# Patient Record
Sex: MALE | Race: OTHER | Employment: FULL TIME | URBAN - METROPOLITAN AREA
[De-identification: names, ages, dates, MRNs, and addresses within clinical notes are randomized per-mention and may not be internally consistent; named-entity substitution may affect disease eponyms.]

---

## 2019-09-09 ENCOUNTER — APPOINTMENT (OUTPATIENT)
Dept: GENERAL RADIOLOGY | Age: 39
End: 2019-09-09
Attending: EMERGENCY MEDICINE
Payer: SELF-PAY

## 2019-09-09 ENCOUNTER — HOSPITAL ENCOUNTER (EMERGENCY)
Age: 39
Discharge: HOME | End: 2019-09-09
Attending: EMERGENCY MEDICINE
Payer: SELF-PAY

## 2019-09-09 VITALS
OXYGEN SATURATION: 98 % | HEIGHT: 67 IN | WEIGHT: 165.1 LBS | RESPIRATION RATE: 16 BRPM | SYSTOLIC BLOOD PRESSURE: 104 MMHG | TEMPERATURE: 98.2 F | BODY MASS INDEX: 25.91 KG/M2 | HEART RATE: 78 BPM | DIASTOLIC BLOOD PRESSURE: 63 MMHG

## 2019-09-09 DIAGNOSIS — R10.12 ABDOMINAL PAIN, LUQ (LEFT UPPER QUADRANT): Primary | ICD-10-CM

## 2019-09-09 LAB
ALBUMIN SERPL-MCNC: 3.9 G/DL (ref 3.5–5)
ALBUMIN/GLOB SERPL: 0.9 {RATIO} (ref 1.2–3.5)
ALP SERPL-CCNC: 54 U/L (ref 50–136)
ALT SERPL-CCNC: 37 U/L (ref 12–65)
ANION GAP SERPL CALC-SCNC: 7 MMOL/L (ref 7–16)
AST SERPL-CCNC: 29 U/L (ref 15–37)
BASOPHILS # BLD: 0 K/UL (ref 0–0.2)
BASOPHILS NFR BLD: 0 % (ref 0–2)
BILIRUB SERPL-MCNC: 0.6 MG/DL (ref 0.2–1.1)
BUN SERPL-MCNC: 15 MG/DL (ref 6–23)
CALCIUM SERPL-MCNC: 8.7 MG/DL (ref 8.3–10.4)
CHLORIDE SERPL-SCNC: 105 MMOL/L (ref 98–107)
CO2 SERPL-SCNC: 27 MMOL/L (ref 21–32)
CREAT SERPL-MCNC: 0.72 MG/DL (ref 0.8–1.5)
DIFFERENTIAL METHOD BLD: ABNORMAL
EOSINOPHIL # BLD: 0.3 K/UL (ref 0–0.8)
EOSINOPHIL NFR BLD: 3 % (ref 0.5–7.8)
ERYTHROCYTE [DISTWIDTH] IN BLOOD BY AUTOMATED COUNT: 13.2 % (ref 11.9–14.6)
ETHANOL SERPL-MCNC: <3 MG/DL
GLOBULIN SER CALC-MCNC: 4.3 G/DL (ref 2.3–3.5)
GLUCOSE SERPL-MCNC: 121 MG/DL (ref 65–100)
HCT VFR BLD AUTO: 43.4 % (ref 41.1–50.3)
HGB BLD-MCNC: 13.3 G/DL (ref 13.6–17.2)
IMM GRANULOCYTES # BLD AUTO: 0 K/UL (ref 0–0.5)
IMM GRANULOCYTES NFR BLD AUTO: 0 % (ref 0–5)
LIPASE SERPL-CCNC: 157 U/L (ref 73–393)
LYMPHOCYTES # BLD: 1.5 K/UL (ref 0.5–4.6)
LYMPHOCYTES NFR BLD: 15 % (ref 13–44)
MAGNESIUM SERPL-MCNC: 2 MG/DL (ref 1.8–2.4)
MCH RBC QN AUTO: 26.1 PG (ref 26.1–32.9)
MCHC RBC AUTO-ENTMCNC: 30.6 G/DL (ref 31.4–35)
MCV RBC AUTO: 85.3 FL (ref 79.6–97.8)
MONOCYTES # BLD: 0.7 K/UL (ref 0.1–1.3)
MONOCYTES NFR BLD: 8 % (ref 4–12)
NEUTS SEG # BLD: 7.2 K/UL (ref 1.7–8.2)
NEUTS SEG NFR BLD: 74 % (ref 43–78)
NRBC # BLD: 0 K/UL (ref 0–0.2)
PLATELET # BLD AUTO: 192 K/UL (ref 150–450)
PMV BLD AUTO: 10.8 FL (ref 9.4–12.3)
POTASSIUM SERPL-SCNC: 4.1 MMOL/L (ref 3.5–5.1)
PROT SERPL-MCNC: 8.2 G/DL (ref 6.3–8.2)
RBC # BLD AUTO: 5.09 M/UL (ref 4.23–5.6)
SODIUM SERPL-SCNC: 139 MMOL/L (ref 136–145)
WBC # BLD AUTO: 9.7 K/UL (ref 4.3–11.1)

## 2019-09-09 PROCEDURE — 83690 ASSAY OF LIPASE: CPT

## 2019-09-09 PROCEDURE — 83735 ASSAY OF MAGNESIUM: CPT

## 2019-09-09 PROCEDURE — 74011000250 HC RX REV CODE- 250: Performed by: EMERGENCY MEDICINE

## 2019-09-09 PROCEDURE — 74011250637 HC RX REV CODE- 250/637: Performed by: EMERGENCY MEDICINE

## 2019-09-09 PROCEDURE — 80307 DRUG TEST PRSMV CHEM ANLYZR: CPT

## 2019-09-09 PROCEDURE — 81003 URINALYSIS AUTO W/O SCOPE: CPT | Performed by: EMERGENCY MEDICINE

## 2019-09-09 PROCEDURE — 74019 RADEX ABDOMEN 2 VIEWS: CPT

## 2019-09-09 PROCEDURE — 85025 COMPLETE CBC W/AUTO DIFF WBC: CPT

## 2019-09-09 PROCEDURE — 99283 EMERGENCY DEPT VISIT LOW MDM: CPT | Performed by: EMERGENCY MEDICINE

## 2019-09-09 PROCEDURE — 80053 COMPREHEN METABOLIC PANEL: CPT

## 2019-09-09 RX ORDER — FAMOTIDINE 20 MG/1
20 TABLET, FILM COATED ORAL 2 TIMES DAILY
Qty: 30 TAB | Refills: 0 | Status: SHIPPED | OUTPATIENT
Start: 2019-09-09 | End: 2019-09-24

## 2019-09-09 RX ORDER — MAG HYDROX/ALUMINUM HYD/SIMETH 200-200-20
30 SUSPENSION, ORAL (FINAL DOSE FORM) ORAL
Status: COMPLETED | OUTPATIENT
Start: 2019-09-09 | End: 2019-09-09

## 2019-09-09 RX ORDER — FAMOTIDINE 20 MG/1
20 TABLET, FILM COATED ORAL 2 TIMES DAILY
Qty: 30 TAB | Refills: 0 | Status: SHIPPED | OUTPATIENT
Start: 2019-09-09 | End: 2019-09-09

## 2019-09-09 RX ORDER — LIDOCAINE HYDROCHLORIDE 20 MG/ML
15 SOLUTION OROPHARYNGEAL
Status: COMPLETED | OUTPATIENT
Start: 2019-09-09 | End: 2019-09-09

## 2019-09-09 RX ADMIN — LIDOCAINE HYDROCHLORIDE 15 ML: 20 SOLUTION ORAL; TOPICAL at 12:01

## 2019-09-09 RX ADMIN — ALUMINUM HYDROXIDE, MAGNESIUM HYDROXIDE, AND SIMETHICONE 30 ML: 200; 200; 20 SUSPENSION ORAL at 12:01

## 2019-09-09 NOTE — ED NOTES
Patient complaining of left upper abdominal pain x 1 month. Patient advises he attempted some medication he brought at a home remedy store however it made pain worse. Patient advises also left midaxillary pain. Patient denies any nausea, vomiting, diarrhea or constipation. Advises drinking about 12 beers, 3 times a week.

## 2019-09-09 NOTE — ED NOTES
I have reviewed discharge instructions with the patient. The patient verbalized understanding. Patient left ED via Discharge Method: ambulatory to Home with self.  present at d/c. Opportunity for questions and clarification provided. Patient given 1 scripts. To continue your aftercare when you leave the hospital, you may receive an automated call from our care team to check in on how you are doing. This is a free service and part of our promise to provide the best care and service to meet your aftercare needs.  If you have questions, or wish to unsubscribe from this service please call [phone]. Thank you for Choosing our [employer] [facility].

## 2019-09-09 NOTE — ROUTINE PROCESS
present for assessment with Dr. Donna Valdes, RN and registration.      Georgette Montes CHI//   Patient Relations & Interpreting Services  p: [phone] / [email or web address]

## 2019-09-09 NOTE — DISCHARGE INSTRUCTIONS
Patient Education        Dolor abdominal: Instrucciones de cuidado - [ Abdominal Pain: Care Instructions ]  Instrucciones de cuidado    El dolor abdominal tiene muchas causas posibles. Algunas de ellas no son graves y mejoran por sí solas en unos días. Otras requieren [place] y [place]. Si espinoza dolor continúa o [place], necesitará aren nueva revisión y [place] pruebas para determinar qué pasa. Es posible que necesite cirugía para corregir el problema. No ignore nuevos síntomas, ilan fiebre, náuseas y [place], [address] [facility], dolor que [place] o B[place]. Podrían ser señales de un problema más grave. Espinoza médico puede haberle recomendado aren consulta de [employer] las 8 o 12 horas siguientes. Si no se siente mejor, es posible que requiera [place] o [place]. El médico lo barajas revisado minuciosamente, margie puede nilda problemas más tarde. Si nota algún problema o síntomas nuevos, busque tratamiento médico inmediatamente. La atención de seguimiento es aren parte clave de espinoza tratamiento y seguridad. Asegúrese de hacer y acudir a todas las citas, y llame a espinoza médico si está teniendo problemas. También es aren buena idea saber los resultados de renetta exámenes y mantener aren lista de los medicamentos que tasneem. ¿Cómo puede cuidarse en el hogar? · Descanse hasta que se sienta mejor. · Para prevenir la deshidratación, destiny abundantes líquidos, suficientes para que espinoza orina sea de color amarillo magdy o transparente ilan el agua. Elija beber agua y otros líquidos jocelyn sin cafeína hasta que se sienta mejor. Si tiene [employer] [employer], del corazón o del hígado y tiene que [place] líquidos, hable con espinoza médico antes de aumentar espinoza consumo. · Si tiene [employer], coma alimentos suaves, ilan arroz, pan josue seco o galletas saladas, bananas (plátanos) y puré de [place]. Trate de comer varias comidas pequeñas al día en lugar de dos o britany grandes.   · Espere hasta 48 horas después de que todos los síntomas hayan desaparecido antes de comer alimentos condimentados, alcohol y bebidas que contengan cafeína. · No consuma alimentos ricos en grasa. · Evite medicamentos antiinflamatorios ilan aspirina, ibuprofeno (Advil, Motrin) y naproxeno (Aleve). Pueden causar [employer]. Dígale a espinoza médico si está tomando aspirina diariamente debido a otro problema de cj. ¿Cuándo debe pedir ayuda? Llame al 911 en cualquier momento que considere que necesita atención de emergencia. Por ejemplo, llame si:    · Se desmayó (perdió el conocimiento).   · Las heces son de color rojizo o muy sanguinolentas (con jenny).   · Vomita jenny o algo parecido a granos de café molido.     · Tiene dolor abdominal nuevo e intenso.    Llame a espinoza médico ahora mismo o busque atención médica inmediata si:    · Espinoza dolor empeora, sobre todo si se concentra en aren naresh parte del vientre.     · Vuelve a tener fiebre o tiene fiebre más rocco.     · Danielle heces son negruzcas y parecidas al alquitrán o tienen rastros de jenny.     · Tiene sangrado vaginal inesperado.     · Tiene síntomas de aren infección del tracto urinario. Estos podrían incluir:  ? Dolor al [facility]. ? Orinar con más frecuencia que lo habitual.  ? Jenny en la [facility].     · Siente mareos o aturdimiento, o que está a punto de desmayarse.    Preste especial atención a los cambios en espinoza cj y asegúrese de comunicarse con espinoza médico si:    · No está mejorando después de 1 día (24 horas). ¿Dónde puede encontrar más información en inglés? Natalie Lowry a http://brown-ange.info/. Donia Bucker [id number] en la búsqueda para aprender más acerca de \"Dolor abdominal: Instrucciones de cuidado - [ Abdominal Pain: Care Instructions ]. \"  Revisado: 23 septiembre, 2018  Versión del contenido: 12.1  © 8546-2971 Healthwise, [employer].  Las instrucciones de cuidado fueron adaptadas bajo licencia por [employer] (which disclaims liability or warranty for this information). Si usted tiene [place] afección médica o sobre estas instrucciones, siempre pregunte a espinoza profesional de cj. [facility] niega toda garantía o responsabilidad por espinoza uso de esta información.

## 2019-09-09 NOTE — ED PROVIDER NOTES
Orelia Boast is a 42 y.o. male who presents to the ED with a chief complaint of upper abdominal pain. Pain is 10 out of 10 pain is on the left side has had a history of this pain off and on for a long time it has been worse over the last month. He states he tries various home remedies but they do not typically work he does typically drink 12 beers a day for at least 3 or 4 days of the weekend and states he takes some days off. He denies knowing of any history of kidney stones. He has had no vomiting or diarrhea. He has had some dysuria, but no blood in the urine.  used for interview. Past Medical History:   Diagnosis Date    Ill-defined condition     hernia    Ill-defined condition     kidney       No past surgical history on file. No family history on file. Social History     Socioeconomic History    Marital status: SINGLE     Spouse name: Not on file    Number of children: Not on file    Years of education: Not on file    Highest education level: Not on file   Occupational History    Not on file   Social Needs    Financial resource strain: Not on file    Food insecurity:     Worry: Not on file     Inability: Not on file    Transportation needs:     Medical: Not on file     Non-medical: Not on file   Tobacco Use    Smoking status: Never Smoker    Smokeless tobacco: Never Used   Substance and Sexual Activity    Alcohol use:  Yes     Alcohol/week: 36.0 standard drinks     Types: 36 Cans of beer per week     Comment: advises 12 beers 3 times a week    Drug use: Never    Sexual activity: Not on file   Lifestyle    Physical activity:     Days per week: Not on file     Minutes per session: Not on file    Stress: Not on file   Relationships    Social connections:     Talks on phone: Not on file     Gets together: Not on file     Attends Latter-day service: Not on file     Active member of club or organization: Not on file     Attends meetings of clubs or organizations: Not on file     Relationship status: Not on file    Intimate partner violence:     Fear of current or ex partner: Not on file     Emotionally abused: Not on file     Physically abused: Not on file     Forced sexual activity: Not on file   Other Topics Concern    Not on file   Social History Narrative    Not on file         ALLERGIES: Patient has no known allergies. Review of Systems   Constitutional: Negative for chills and fever. HENT: Negative for congestion, sore throat and trouble swallowing. Respiratory: Negative for cough, choking, chest tightness, shortness of breath, wheezing and stridor. Cardiovascular: Negative for chest pain, palpitations and leg swelling. Gastrointestinal: Positive for abdominal pain and nausea. Negative for abdominal distention, anal bleeding, blood in stool, diarrhea, rectal pain and vomiting. Genitourinary: Positive for dysuria and flank pain. Musculoskeletal: Negative for arthralgias, myalgias, neck pain and neck stiffness. Skin: Negative for color change, pallor, rash and wound. All other systems reviewed and are negative. Vitals:    09/09/19 0955   BP: 104/63   Pulse: 78   Resp: 16   Temp: 98.2 °F (36.8 °C)   SpO2: 98%   Weight: 74.9 kg (165 lb 1.6 oz)   Height: 5' 7\" (1.702 m)            Physical Exam   Constitutional: He is oriented to person, place, and time. He appears well-developed and well-nourished. No distress. HENT:   Head: Normocephalic and atraumatic. Right Ear: External ear normal.   Left Ear: External ear normal.   Mouth/Throat: No oropharyngeal exudate. Eyes: Pupils are equal, round, and reactive to light. Conjunctivae and EOM are normal. Right eye exhibits no discharge. Left eye exhibits no discharge. No scleral icterus. Neck: Normal range of motion. Neck supple. No JVD present. No tracheal deviation present. No thyromegaly present. Cardiovascular: Normal rate, regular rhythm and normal heart sounds.  Exam reveals no gallop and no friction rub. No murmur heard. Pulmonary/Chest: Effort normal and breath sounds normal. No stridor. No respiratory distress. He has no wheezes. He has no rales. He exhibits no tenderness. Abdominal: Soft. He exhibits no distension and no mass. There is no tenderness. There is no rebound and no guarding. Musculoskeletal: Normal range of motion. He exhibits no tenderness. Neurological: He is alert and oriented to person, place, and time. He displays normal reflexes. No cranial nerve deficit. Coordination normal.   Skin: Skin is warm and dry. Capillary refill takes less than 2 seconds. No rash noted. He is not diaphoretic. No erythema. No pallor. Psychiatric: He has a normal mood and affect. His behavior is normal.   Nursing note and vitals reviewed. MDM  Number of Diagnoses or Management Options  Abdominal pain, LUQ (left upper quadrant):   Diagnosis management comments: Patient counseled to avoid alcohol. His labs and x-ray are negative. I did see a negative CT scan done this year for the same pain. I am going to start him on H2 blockers and I will refer to gastroenterology. Nora Wisdom MD; 9/9/2019 @1:08 PM Voice dictation software was used during the making of this note. This software is not perfect and grammatical and other typographical errors may be present.   This note has not been proofread for errors.  ===================================================================          Amount and/or Complexity of Data Reviewed  Clinical lab tests: ordered and reviewed (Results for orders placed or performed during the hospital encounter of 09/09/19  -CBC WITH AUTOMATED DIFF       Result                      Value             Ref Range           WBC                         9.7               4.3 - 11.1 K*       RBC                         5.09              4.23 - 5.6 M*       HGB                         13.3 (L)          13.6 - 17.2 *       HCT                         43.4 41.1 - 50.3 %       MCV                         85.3              79.6 - 97.8 *       MCH                         26.1              26.1 - 32.9 *       MCHC                        30.6 (L)          31.4 - 35.0 *       RDW                         13.2              11.9 - 14.6 %       PLATELET                    192               150 - 450 K/*       MPV                         10.8              9.4 - 12.3 FL       ABSOLUTE NRBC               0.00              0.0 - 0.2 K/*       DF                          AUTOMATED                             NEUTROPHILS                 74                43 - 78 %           LYMPHOCYTES                 15                13 - 44 %           MONOCYTES                   8                 4.0 - 12.0 %        EOSINOPHILS                 3                 0.5 - 7.8 %         BASOPHILS                   0                 0.0 - 2.0 %         IMMATURE GRANULOCYTES       0                 0.0 - 5.0 %         ABS. NEUTROPHILS            7.2               1.7 - 8.2 K/*       ABS. LYMPHOCYTES            1.5               0.5 - 4.6 K/*       ABS. MONOCYTES              0.7               0.1 - 1.3 K/*       ABS. EOSINOPHILS            0.3               0.0 - 0.8 K/*       ABS. BASOPHILS              0.0               0.0 - 0.2 K/*       ABS. IMM.  GRANS.            0.0               0.0 - 0.5 K/*  -METABOLIC PANEL, COMPREHENSIVE       Result                      Value             Ref Range           Sodium                      139               136 - 145 mm*       Potassium                   4.1               3.5 - 5.1 mm*       Chloride                    105               98 - 107 mmo*       CO2                         27                21 - 32 mmol*       Anion gap                   7                 7 - 16 mmol/L       Glucose                     121 (H)           65 - 100 mg/*       BUN                         15                6 - 23 MG/DL        Creatinine                  0.72 (L) 0.8 - 1.5 MG*       GFR est AA                  >60               >60 ml/min/1*       GFR est non-AA              >60               >60 ml/min/1*       Calcium                     8.7               8.3 - 10.4 M*       Bilirubin, total            0.6               0.2 - 1.1 MG*       ALT (SGPT)                  37                12 - 65 U/L         AST (SGOT)                  29                15 - 37 U/L         Alk.  phosphatase            54                50 - 136 U/L        Protein, total              8.2               6.3 - 8.2 g/*       Albumin                     3.9               3.5 - 5.0 g/*       Globulin                    4.3 (H)           2.3 - 3.5 g/*       A-G Ratio                   0.9 (L)           1.2 - 3.5      -LIPASE       Result                      Value             Ref Range           Lipase                      157               73 - 393 U/L   -ETHYL ALCOHOL       Result                      Value             Ref Range           ALCOHOL(ETHYL),SERUM        <3                MG/DL          -MAGNESIUM       Result                      Value             Ref Range           Magnesium                   2.0               1.8 - 2.4 mg* )           Procedures

## 2019-12-02 ENCOUNTER — HOSPITAL ENCOUNTER (OUTPATIENT)
Dept: LAB | Age: 39
Discharge: HOME | End: 2019-12-02

## 2019-12-02 PROCEDURE — 88312 SPECIAL STAINS GROUP 1: CPT

## 2019-12-02 PROCEDURE — 88305 TISSUE EXAM BY PATHOLOGIST: CPT

## 2019-12-23 ENCOUNTER — HOSPITAL ENCOUNTER (EMERGENCY)
Age: 39
Discharge: HOME | End: 2019-12-23
Attending: EMERGENCY MEDICINE
Payer: SELF-PAY

## 2019-12-23 VITALS
HEIGHT: 67 IN | SYSTOLIC BLOOD PRESSURE: 127 MMHG | OXYGEN SATURATION: 97 % | DIASTOLIC BLOOD PRESSURE: 79 MMHG | HEART RATE: 83 BPM | TEMPERATURE: 98.3 F | WEIGHT: 170 LBS | RESPIRATION RATE: 18 BRPM | BODY MASS INDEX: 26.68 KG/M2

## 2019-12-23 DIAGNOSIS — K29.90 GASTRITIS AND DUODENITIS: Primary | ICD-10-CM

## 2019-12-23 LAB
ALBUMIN SERPL-MCNC: 3.7 G/DL (ref 3.5–5)
ALBUMIN/GLOB SERPL: 0.9 {RATIO} (ref 1.2–3.5)
ALP SERPL-CCNC: 61 U/L (ref 50–136)
ALT SERPL-CCNC: 33 U/L (ref 12–65)
ANION GAP SERPL CALC-SCNC: 5 MMOL/L (ref 7–16)
AST SERPL-CCNC: 21 U/L (ref 15–37)
BASOPHILS # BLD: 0 K/UL (ref 0–0.2)
BASOPHILS NFR BLD: 0 % (ref 0–2)
BILIRUB SERPL-MCNC: 0.9 MG/DL (ref 0.2–1.1)
BUN SERPL-MCNC: 20 MG/DL (ref 6–23)
CALCIUM SERPL-MCNC: 8.9 MG/DL (ref 8.3–10.4)
CHLORIDE SERPL-SCNC: 102 MMOL/L (ref 98–107)
CO2 SERPL-SCNC: 28 MMOL/L (ref 21–32)
CREAT SERPL-MCNC: 0.7 MG/DL (ref 0.8–1.5)
DIFFERENTIAL METHOD BLD: ABNORMAL
EOSINOPHIL # BLD: 0.3 K/UL (ref 0–0.8)
EOSINOPHIL NFR BLD: 3 % (ref 0.5–7.8)
ERYTHROCYTE [DISTWIDTH] IN BLOOD BY AUTOMATED COUNT: 13.4 % (ref 11.9–14.6)
GLOBULIN SER CALC-MCNC: 4.3 G/DL (ref 2.3–3.5)
GLUCOSE SERPL-MCNC: 91 MG/DL (ref 65–100)
HCT VFR BLD AUTO: 40.6 % (ref 41.1–50.3)
HGB BLD-MCNC: 13.1 G/DL (ref 13.6–17.2)
IMM GRANULOCYTES # BLD AUTO: 0 K/UL (ref 0–0.5)
IMM GRANULOCYTES NFR BLD AUTO: 0 % (ref 0–5)
LIPASE SERPL-CCNC: 132 U/L (ref 73–393)
LYMPHOCYTES # BLD: 2.6 K/UL (ref 0.5–4.6)
LYMPHOCYTES NFR BLD: 27 % (ref 13–44)
MCH RBC QN AUTO: 27 PG (ref 26.1–32.9)
MCHC RBC AUTO-ENTMCNC: 32.3 G/DL (ref 31.4–35)
MCV RBC AUTO: 83.5 FL (ref 79.6–97.8)
MONOCYTES # BLD: 1 K/UL (ref 0.1–1.3)
MONOCYTES NFR BLD: 11 % (ref 4–12)
NEUTS SEG # BLD: 5.6 K/UL (ref 1.7–8.2)
NEUTS SEG NFR BLD: 59 % (ref 43–78)
NRBC # BLD: 0 K/UL (ref 0–0.2)
PLATELET # BLD AUTO: 159 K/UL (ref 150–450)
PMV BLD AUTO: 11.2 FL (ref 9.4–12.3)
POTASSIUM SERPL-SCNC: 3.5 MMOL/L (ref 3.5–5.1)
PROT SERPL-MCNC: 8 G/DL (ref 6.3–8.2)
RBC # BLD AUTO: 4.86 M/UL (ref 4.23–5.6)
SODIUM SERPL-SCNC: 135 MMOL/L (ref 136–145)
WBC # BLD AUTO: 9.5 K/UL (ref 4.3–11.1)

## 2019-12-23 PROCEDURE — 80053 COMPREHEN METABOLIC PANEL: CPT

## 2019-12-23 PROCEDURE — 74011250636 HC RX REV CODE- 250/636: Performed by: EMERGENCY MEDICINE

## 2019-12-23 PROCEDURE — 83690 ASSAY OF LIPASE: CPT

## 2019-12-23 PROCEDURE — 85025 COMPLETE CBC W/AUTO DIFF WBC: CPT

## 2019-12-23 PROCEDURE — 74011250637 HC RX REV CODE- 250/637: Performed by: EMERGENCY MEDICINE

## 2019-12-23 PROCEDURE — 74011000250 HC RX REV CODE- 250: Performed by: EMERGENCY MEDICINE

## 2019-12-23 PROCEDURE — 96374 THER/PROPH/DIAG INJ IV PUSH: CPT | Performed by: EMERGENCY MEDICINE

## 2019-12-23 PROCEDURE — 99283 EMERGENCY DEPT VISIT LOW MDM: CPT | Performed by: EMERGENCY MEDICINE

## 2019-12-23 RX ORDER — PANTOPRAZOLE SODIUM 40 MG/1
40 TABLET, DELAYED RELEASE ORAL DAILY
Qty: 20 TAB | Refills: 0 | Status: SHIPPED | OUTPATIENT
Start: 2019-12-23 | End: 2020-01-12

## 2019-12-23 RX ORDER — ONDANSETRON 2 MG/ML
4 INJECTION INTRAMUSCULAR; INTRAVENOUS
Status: COMPLETED | OUTPATIENT
Start: 2019-12-23 | End: 2019-12-23

## 2019-12-23 RX ORDER — LIDOCAINE HYDROCHLORIDE 20 MG/ML
15 SOLUTION OROPHARYNGEAL
Status: COMPLETED | OUTPATIENT
Start: 2019-12-23 | End: 2019-12-23

## 2019-12-23 RX ORDER — MAG HYDROX/ALUMINUM HYD/SIMETH 200-200-20
30 SUSPENSION, ORAL (FINAL DOSE FORM) ORAL
Status: COMPLETED | OUTPATIENT
Start: 2019-12-23 | End: 2019-12-23

## 2019-12-23 RX ADMIN — ALUMINUM HYDROXIDE, MAGNESIUM HYDROXIDE, AND SIMETHICONE 30 ML: 200; 200; 20 SUSPENSION ORAL at 12:50

## 2019-12-23 RX ADMIN — ONDANSETRON 4 MG: 2 INJECTION INTRAMUSCULAR; INTRAVENOUS at 12:50

## 2019-12-23 RX ADMIN — LIDOCAINE HYDROCHLORIDE 15 ML: 20 SOLUTION ORAL; TOPICAL at 12:50

## 2019-12-23 NOTE — ROUTINE PROCESS
present for discharge with [place], [facility]. Phyllis Blue CHI//  Patient Relations & Interpreting Services 
p: [phone] / [email or web address]

## 2019-12-23 NOTE — DISCHARGE INSTRUCTIONS
Patient Education        Gastritis: Instrucciones de cuidado - [ Gastritis: Care Instructions ]  Instrucciones de cuidado    La gastritis es dolor y malestar estomacal. Sucede cuando algo irrita el revestimiento del estómago. Hay muchas cosas que pueden causarla. Entre estas se incluyen aren infección ilan la gripe o algo que ha comido o bebido. Los medicamentos o aren llaga en el recubrimiento del estómago ([place]) también pueden causarla. Puede tener abotagamiento y dolor abdominal. Podría eructar, vomitar y tener revoltura estomacal.  Usted debería poder aliviar el problema tomando medicamentos. Y whit vez sería útil cambiar la alimentación. Si la gastritis continúa, espinoza médico podría recetarle medicamentos. La atención de seguimiento es aren parte clave de espinoza tratamiento y seguridad. Asegúrese de hacer y acudir a todas las citas, y llame a espinoza médico si está teniendo problemas. También es aren buena idea saber los resultados de renetta exámenes y mantener aren lista de los medicamentos que tasneem. ¿Cómo puede cuidarse en el hogar? · Si espinoza médico le recetó antibióticos, tómelos según las indicaciones. No deje de tomarlos por el hecho de sentirse mejor. Debe apurva todos los antibióticos hasta terminarlos. · Sea isela con los medicamentos. Si espinoza médico le recetó un medicamento para reducir el ácido estomacal, tómelo según las indicaciones. Llame a espinoaz médico si demi estar teniendo problemas con espinoza medicamento. · No tome ningún otro medicamento, incluyendo analgésicos (medicamentos para el dolor) de venta shazia, sin consultar con espinoza médico leah. · Si espinoza médico le recomienda medicamentos de venta shazia para reducir el ácido estomacal, tales ilan Pepcid AC, Prilosec, Tagamet HB o Zantac 75, siga las instrucciones de la etiqueta. · [place] abundantes líquidos (los suficientes ilan para que espinoza orina sea de color amarillo magdy o transparente ilan el agua) para prevenir la deshidratación.  Elija apurva agua y otros líquidos jocelyn sin cafeína. Si tiene [facility] [employer], el corazón o el hígado y tiene que [place] líquidos, hable con espinoza médico antes de aumentar espinoza consumo. · Limite la cantidad de alcohol que martita. · Evite el café, el té, las bebidas de cola, el chocolate y otros alimentos que contengan cafeína. Aumentan el ácido estomacal.  ¿Cuándo debe pedir ayuda? Llame al 911 en cualquier momento que considere que necesita atención de [place]. Por ejemplo, llame si:    · Vomita jenny o algo parecido a granos de café molido.     · Danielle heces son de color rojizo o muy sanguinolentas (con jenny).    Llame a espinoza médico ahora mismo o busque atención médica inmediata si:    · Empieza a respirar en forma acelerada y no ha producido orina en las últimas 8 horas.     · No puede retener líquidos en el estómago.    Preste especial atención a los cambios en espinoza cj y asegúrese de comunicarse con espinoza médico si:    · No mejora ilan se esperaba. ¿Dónde puede encontrar más información en inglés? Andrés Pelt a http://brown-ange.info/. Sandra Daughters [id number] en la búsqueda para aprender más acerca de \"Gastritis: Instrucciones de cuidado - [ Gastritis: Care Instructions ]. \"  Revisado: 7 noviembre, 2018  Versión del contenido: 12.2  © 8281-6251 Healthwise, Incorporated. Las instrucciones de cuidado fueron adaptadas bajo licencia por [employer] (which disclaims liability or warranty for this information). Si usted tiene [place] afección médica o sobre estas instrucciones, siempre pregunte a espinoza profesional de cj. Healthwise, Incorporated niega toda garantía o responsabilidad por espinoza uso de esta información.

## 2019-12-23 NOTE — ED PROVIDER NOTES
Patient with a history of gastritis. Unknown if due to alcohol. Does drink alcohol regularly. States he had an EGD done 15 days ago but has not heard the results yet. Yesterday started with worsening epigastric pain over his baseline epigastric pain. No nausea or vomiting. No diarrhea or constipation. No dysuria hematuria. The history is provided by the patient. A  was used. Abdominal Pain    This is a new problem. The current episode started yesterday. The problem occurs constantly. The problem has not changed since onset. The pain is associated with an unknown factor. The pain is located in the epigastric region. The quality of the pain is aching. The pain is moderate. Pertinent negatives include no fever, no diarrhea, no melena, no nausea, no vomiting, no constipation, no dysuria, no hematuria, no headaches, no chest pain and no back pain. Nothing worsens the pain. The pain is relieved by nothing. Past Medical History:   Diagnosis Date    Ill-defined condition     hernia    Ill-defined condition     kidney       History reviewed. No pertinent surgical history. History reviewed. No pertinent family history. Social History     Socioeconomic History    Marital status: SINGLE     Spouse name: Not on file    Number of children: Not on file    Years of education: Not on file    Highest education level: Not on file   Occupational History    Not on file   Social Needs    Financial resource strain: Not on file    Food insecurity:     Worry: Not on file     Inability: Not on file    Transportation needs:     Medical: Not on file     Non-medical: Not on file   Tobacco Use    Smoking status: Never Smoker    Smokeless tobacco: Never Used   Substance and Sexual Activity    Alcohol use:  Yes     Alcohol/week: 36.0 standard drinks     Types: 36 Cans of beer per week     Comment: advises 12 beers 3 times a week    Drug use: Never    Sexual activity: Not on file Lifestyle    Physical activity:     Days per week: Not on file     Minutes per session: Not on file    Stress: Not on file   Relationships    Social connections:     Talks on phone: Not on file     Gets together: Not on file     Attends Yazdanism service: Not on file     Active member of club or organization: Not on file     Attends meetings of clubs or organizations: Not on file     Relationship status: Not on file    Intimate partner violence:     Fear of current or ex partner: Not on file     Emotionally abused: Not on file     Physically abused: Not on file     Forced sexual activity: Not on file   Other Topics Concern    Not on file   Social History Narrative    Not on file         ALLERGIES: Patient has no known allergies. Review of Systems   Constitutional: Negative for chills and fever. HENT: Negative for rhinorrhea and sore throat. Eyes: Negative for pain and redness. Respiratory: Negative for chest tightness, shortness of breath and wheezing. Cardiovascular: Negative for chest pain and leg swelling. Gastrointestinal: Positive for abdominal pain. Negative for constipation, diarrhea, melena, nausea and vomiting. Genitourinary: Negative for dysuria and hematuria. Musculoskeletal: Negative for back pain, gait problem, neck pain and neck stiffness. Skin: Negative for color change and rash. Neurological: Negative for weakness, numbness and headaches. Vitals:    12/23/19 1209   BP: 127/79   Pulse: 83   Resp: 18   Temp: 98.3 °F (36.8 °C)   SpO2: 97%   Weight: 77.1 kg (170 lb)   Height: 5' 7\" (1.702 m)            Physical Exam  Constitutional:       Appearance: He is well-developed. HENT:      Head: Normocephalic and atraumatic. Neck:      Musculoskeletal: Normal range of motion and neck supple. Cardiovascular:      Rate and Rhythm: Normal rate and regular rhythm. Pulmonary:      Effort: Pulmonary effort is normal.      Breath sounds: Normal breath sounds.    Abdominal: General: Bowel sounds are normal.      Palpations: Abdomen is soft. Tenderness: There is tenderness (epigastric and diffuse). Musculoskeletal: Normal range of motion. Skin:     General: Skin is warm and dry. Neurological:      Mental Status: He is alert and oriented to person, place, and time. MDM  Number of Diagnoses or Management Options  Diagnosis management comments: Patient feeling better with medication here. Will discharge at home. Amount and/or Complexity of Data Reviewed  Clinical lab tests: ordered and reviewed  Tests in the medicine section of CPT®: ordered and reviewed    Patient Progress  Patient progress: stable         Procedures        Results Include:    Recent Results (from the past 24 hour(s))   CBC WITH AUTOMATED DIFF    Collection Time: 12/23/19 12:44 PM   Result Value Ref Range    WBC 9.5 4.3 - 11.1 K/uL    RBC 4.86 4.23 - 5.6 M/uL    HGB 13.1 (L) 13.6 - 17.2 g/dL    HCT 40.6 (L) 41.1 - 50.3 %    MCV 83.5 79.6 - 97.8 FL    MCH 27.0 26.1 - 32.9 PG    MCHC 32.3 31.4 - 35.0 g/dL    RDW 13.4 11.9 - 14.6 %    PLATELET 176 542 - 405 K/uL    MPV 11.2 9.4 - 12.3 FL    ABSOLUTE NRBC 0.00 0.0 - 0.2 K/uL    DF AUTOMATED      NEUTROPHILS 59 43 - 78 %    LYMPHOCYTES 27 13 - 44 %    MONOCYTES 11 4.0 - 12.0 %    EOSINOPHILS 3 0.5 - 7.8 %    BASOPHILS 0 0.0 - 2.0 %    IMMATURE GRANULOCYTES 0 0.0 - 5.0 %    ABS. NEUTROPHILS 5.6 1.7 - 8.2 K/UL    ABS. LYMPHOCYTES 2.6 0.5 - 4.6 K/UL    ABS. MONOCYTES 1.0 0.1 - 1.3 K/UL    ABS. EOSINOPHILS 0.3 0.0 - 0.8 K/UL    ABS. BASOPHILS 0.0 0.0 - 0.2 K/UL    ABS. IMM.  GRANS. 0.0 0.0 - 0.5 K/UL   METABOLIC PANEL, COMPREHENSIVE    Collection Time: 12/23/19 12:44 PM   Result Value Ref Range    Sodium 135 (L) 136 - 145 mmol/L    Potassium 3.5 3.5 - 5.1 mmol/L    Chloride 102 98 - 107 mmol/L    CO2 28 21 - 32 mmol/L    Anion gap 5 (L) 7 - 16 mmol/L    Glucose 91 65 - 100 mg/dL    BUN 20 6 - 23 MG/DL    Creatinine 0.70 (L) 0.8 - 1.5 MG/DL    GFR est AA >60 >60 ml/min/1.73m2    GFR est non-AA >60 >60 ml/min/1.73m2    Calcium 8.9 8.3 - 10.4 MG/DL    Bilirubin, total 0.9 0.2 - 1.1 MG/DL    ALT (SGPT) 33 12 - 65 U/L    AST (SGOT) 21 15 - 37 U/L    Alk.  phosphatase 61 50 - 136 U/L    Protein, total 8.0 6.3 - 8.2 g/dL    Albumin 3.7 3.5 - 5.0 g/dL    Globulin 4.3 (H) 2.3 - 3.5 g/dL    A-G Ratio 0.9 (L) 1.2 - 3.5     LIPASE    Collection Time: 12/23/19 12:44 PM   Result Value Ref Range    Lipase 132 73 - 393 U/L

## 2019-12-23 NOTE — ED NOTES
I have reviewed discharge instructions with the patient. The patient verbalized understanding. Patient left ED via Discharge Method: ambulatory to Home with self.  called for d/c instructions. Opportunity for questions and clarification provided. Patient given 1 scripts. To continue your aftercare when you leave the hospital, you may receive an automated call from our care team to check in on how you are doing. This is a free service and part of our promise to provide the best care and service to meet your aftercare needs.  If you have questions, or wish to unsubscribe from this service please call [phone]. Thank you for Choosing our [facility] Emergency Department.

## 2019-12-23 NOTE — ROUTINE PROCESS
present for assessment with Dr. Del Guajardo and [facility], [facility]. [employer]//  Patient Relations & Interpreting Services 
p: [phone] / [email or web address]